# Patient Record
Sex: MALE | Race: BLACK OR AFRICAN AMERICAN | NOT HISPANIC OR LATINO | URBAN - METROPOLITAN AREA
[De-identification: names, ages, dates, MRNs, and addresses within clinical notes are randomized per-mention and may not be internally consistent; named-entity substitution may affect disease eponyms.]

---
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
## 2021-05-31 VITALS — BODY MASS INDEX: 17.26 KG/M2 | WEIGHT: 113.9 LBS | HEIGHT: 68 IN

## 2021-06-12 NOTE — PROGRESS NOTES
"    Assessment:      Satisfactory school sports physical exam.       Plan:      Permission granted to participate in athletics without restrictions. Form signed and returned to patient.  Anticipatory guidance: Gave handout on well-child issues at this age.  Specific topics reviewed: bicycle helmets, breast self-exam, drugs, ETOH, and tobacco, importance of regular dental care, importance of regular exercise, importance of varied diet, minimize junk food, puberty, seat belts, sex; STD and pregnancy prevention and testicular self-exam.     Subjective:       Randell Jackson is a 17 y.o. male who presents for a school sports physical exam. Patient/parent deny any current health related concerns.  He plans to participate in soccer.    Immunization History   Administered Date(s) Administered     Meningococcal MCV4P 07/01/2016     Varicella 12/08/2016       The following portions of the patient's history were reviewed and updated as appropriate: allergies, current medications, past family history, past medical history, past social history, past surgical history and problem list.    Review of Systems  Pertinent items are noted in HPI      Objective:      /62 (Patient Site: Right Arm, Patient Position: Sitting, Cuff Size: Adult Regular)  Pulse 78  Ht 5' 7.5\" (1.715 m)  Wt 113 lb 14.4 oz (51.7 kg)  SpO2 99%  BMI 17.58 kg/m2    General Appearance:  Alert, cooperative, no distress, appropriate for age                             Head:  Normocephalic, no obvious abnormality                              Eyes:  PERRL, EOM's intact, conjunctiva and corneas clear, fundi benign, both eyes                              Nose:  Nares symmetrical, septum midline, mucosa pink, clear watery discharge; no sinus tenderness                           Throat:  Lips, tongue, and mucosa are moist, pink, and intact; teeth intact                              Neck:  Supple, symmetrical, trachea midline, no adenopathy; thyroid: " no enlargement, symmetric,no tenderness/mass/nodules; no carotid bruit, no JVD                              Back:  Symmetrical, no curvature, ROM normal, no CVA tenderness                Chest/Breast:  No mass or tenderness                            Lungs:  Clear to auscultation bilaterally, respirations unlabored                              Heart:  Normal PMI, regular rate & rhythm, S1 and S2 normal, no murmurs, rubs, or gallops                      Abdomen:  Soft, non-tender, bowel sounds active all four quadrants, no mass, or organomegaly               Genitourinary:  deferred          Musculoskeletal:  Tone and strength strong and symmetrical, all extremities                     Lymphatic:  No adenopathy             Skin/Hair/Nails:  Skin warm, dry, and intact, no rashes or abnormal dyspigmentation                   Neurologic:  Alert and oriented x3, no cranial nerve deficits, normal strength and tone, gait steady

## 2021-06-22 NOTE — TELEPHONE ENCOUNTER
Pt's Aunt Lakeisha calling with Pt in the background.  Pt was playing floor hockey at gym class today and was hit in his bottom lip by a hockey stick.  He has a 1/2 inch long cut on the boarder of his lower lip.  Cut is no longer bleeding.  Mild swelling.  Cut does not seem to go through his lip but there are teeth marks on inner lip as well.  Denies breathing difficulties.    PLAN  Per protocol, Pt should be assessed in ED. Could also be seen at [facility].  Advised to apply ice to lip until seen.  Call back if any further questions or concerns.  Rohini Vivar, RN, [facility] RN Triage/Med Refills        Reason for Disposition    Gaping cut through border of the Lip where it meets the skin (if unsure, refer in if cut length > 1/4 inch or 6 mm on the face)    Protocols used: MOUTH INJURY-P-OH